# Patient Record
(demographics unavailable — no encounter records)

---

## 2025-03-07 NOTE — DATA REVIEWED
[FreeTextEntry1] : Pt has carotid artery US which shows no evidence of plaques Has POC bedside cardiology US report showing no acute cardiac pathology Detail Level: Detailed

## 2025-03-07 NOTE — PHYSICAL EXAM
[No Acute Distress] : no acute distress [Well-Appearing] : well-appearing [Normal Sclera/Conjunctiva] : normal sclera/conjunctiva [EOMI] : extraocular movements intact [Regular Rhythm] : with a regular rhythm [No Murmur] : no murmur heard [Non Tender] : non-tender [Non-distended] : non-distended [Normal Bowel Sounds] : normal bowel sounds [Coordination Grossly Intact] : coordination grossly intact [Normal Gait] : normal gait [Normal] : affect was normal and insight and judgment were intact [de-identified] : tachycardic

## 2025-03-07 NOTE — HEALTH RISK ASSESSMENT
[2 - 4 times a month (2 pts)] : 2-4 times a month (2 points) [1 or 2 (0 pts)] : 1 or 2 (0 points) [Never (0 pts)] : Never (0 points) [0] : 2) Feeling down, depressed, or hopeless: Not at all (0) [PHQ-2 Negative - No further assessment needed] : PHQ-2 Negative - No further assessment needed [HIV Test offered] : HIV Test offered [Never] : Never [NO] : No [PapSmearComments] : due, will schedule

## 2025-03-07 NOTE — HISTORY OF PRESENT ILLNESS
[de-identified] : ZHANNA MCNALLY is a 32 year y/o F with PMH of anxiety who presents as a new patient today to establish care. CC genital herpes, low energy  #Tachycardia Feels scared of doctors right now Notes episodes of her heart racing Has known general anxiety, works with a therapist  #Recent Episode of Fainting Used to faint around age 15 Happened last week and went to the Emergency Room Was told she had a stomach bug Had diarrhea x 3 days Still feeling low energy but fine now Saw a cardiologist when she was little and was told not to have too much caffeine or chocolate  #HSV2 Started tracking her outbreaks and it is occurring more frequently than she realizes Occurring every couple of months Currently takes valacyclovir if she feels it coming on Notes that the pills are very large and difficult to swallow We looked up options and there is an option for 500 mg tablets instead of 1 g which we will order for her Discussed daily medication which she prefers to hold off on for now as her outbreaks are not very frequent.  PMH: HSV2, anxiety, low iron, elevated cholesterol PSH: none Allergies: NKDA Medications: minoxidil foam qod, valacyclovir episodically Fam Hx: see chart Social History: Lives with partner Works as , works on a computer Tobacco use: never smoker Alcohol use: rarely, once q2w, 1 drink Drug use: none, occasional weed Sexually active: Y, doesn't need testing today Diet & Exercise: runs 3x/week, yoga and gym strength, usually 5 days per week Mood: anxiety, notes that depression screen from today wasn't great and overall she does not feel depressed. Firearms: No  #Health Maintenance Flu shot: due Covid vaccine: due Tdap: not sure, may be due Gardasil: completed in her 20s Pap: due, will schedule Gyn or here, has had colposcopy in the past STI screen: not today Family Planning: first day can be bad but relieved with heating pad and raspberry leaf tea. Notes regular and last 3-4 days. Using condoms.

## 2025-03-07 NOTE — ASSESSMENT
[FreeTextEntry1] : #Tachycardia Provider alerted by medical assistant that patient was tachycardic, she was left on the pulse ox and throughout our visit her HR fluctuated from the 80s to high 90s. She notes that she experiences episodes of heart racing which she attributes to anxiety, especially after recent ED visit for syncopal event during GI illness. While her tachycardia is likely due to anxiety, MA noted rates close to 200 and she would benefit from holter monitoring to rule out other causes. Discussed with patient, and she is okay with EKG today as well. Reviewed ED precautions  #HSV2 After discussing options for treatment, she prefers to stick with episodic valacyclovir, however we will change the dose from 1 g pill to taking two 500 mg tablets which will hopefully be easier to swallow.  #HM Declines flu and covid Thinks due for tdap Due for pap, can schedule here or go to Gyn Completed gardasil in her 20s Will do blood STI screening today and can do swabs with pap Okay with bloodwork today

## 2025-03-07 NOTE — PLAN
[FreeTextEntry1] : #Tachycardia - EKG - Cardiology referral - ED precautions - Continue treating anxiety with therapist  #HSV2 - Valacyclovir 500 mg take 2 tablets daily at the onset of an outbreak x 5 days - Abstain from sexual contact during outbreaks  #HM - Bloodwork today - Vaccine appt for tdap - Appt for pap

## 2025-04-01 NOTE — PHYSICAL EXAM
[No Acute Distress] : no acute distress [Well-Appearing] : well-appearing [Normal Sclera/Conjunctiva] : normal sclera/conjunctiva [EOMI] : extraocular movements intact [Normal Outer Ear/Nose] : the outer ears and nose were normal in appearance [Coordination Grossly Intact] : coordination grossly intact [Normal Gait] : normal gait [External Female Genitalia] : normal external genitalia [Vagina] : normal vaginal exam [Cervix] : normal cervix [Chaperone Present] : A chaperone was present in the examining room during all aspects of the physical examination [[id number]] : A chaperone was present during the pelvic exam. [Normal] : no joint swelling and grossly normal strength and tone [FreeTextEntry2] : Ela Hopper

## 2025-04-01 NOTE — HISTORY OF PRESENT ILLNESS
[de-identified] : ZHANNA MCNALLY is a 32 year y/o F who presents for follow-up today. CC pap, tdap  #Cervical Cancer Screening H/o abn pap: Y, s/p colposcopy, will get records  Last pap: thinks 2022 and was told it was normal Pre-Covid: aaw a gynecologist, was told that she had cells "that could turn precancerous" which was removed (?colposcopy) Shares h/o genital wart which previous provider removed and thinks was biopsied Also has HSV2 no current outbreaks, gave smaller pill size at last visit with good result  #Lab Review We discussed her improvement in cholesterol  #Elevated HR  #Anxiety Saw a non-[facility] Cardiologist who was not concerned Did an echo and got another EKG No more episodes of fainting, no dizziness during exercise, no episodes of heart racing  #HM Due for tdap

## 2025-04-01 NOTE — PLAN
[FreeTextEntry1] : #Encounter for Pap - Pap with HPV co-testing - Get records of previous paps and colposcopy  #HM - STI screening (vaginal swabs) - Tdap today

## 2025-04-01 NOTE — ASSESSMENT
[FreeTextEntry1] : #Encounter for Pap Pt with h/o abn pap and colposcopy, followed by normal pap 3 years ago. Has not been able to find records yet.  #HM We discussed tdap today Additionally, will do STI swabs (blood tests for HIV and syphilis drawn at last appt) Reviewed recent results as well as Echo from cardiologist